# Patient Record
Sex: FEMALE | Race: WHITE
[De-identification: names, ages, dates, MRNs, and addresses within clinical notes are randomized per-mention and may not be internally consistent; named-entity substitution may affect disease eponyms.]

---

## 2017-05-08 ENCOUNTER — HOSPITAL ENCOUNTER (EMERGENCY)
Dept: HOSPITAL 11 - JP.ED | Age: 32
Discharge: HOME | End: 2017-05-08
Payer: MEDICAID

## 2017-05-08 VITALS — DIASTOLIC BLOOD PRESSURE: 83 MMHG | SYSTOLIC BLOOD PRESSURE: 136 MMHG

## 2017-05-08 DIAGNOSIS — N39.0: Primary | ICD-10-CM

## 2017-05-08 DIAGNOSIS — Z79.899: ICD-10-CM

## 2017-05-08 NOTE — EDM.PDOC
ED HPI RENAL/





- General


Chief Complaint: Genitourinary Problem


Stated Complaint: POSSIBLE BLADDER INFECTION


Time Seen by Provider: 17 18:50


Source: Reports: Patient


History Limitations: Reports: No limitations





- History of Present Illness


INITIAL COMMENTS - FREE TEXT/NARRATIVE: 





24 hr hx of decreased ability to urinate. States small amounts of urine 

produced with each void. Minimal pain. Low back pain, greater on left than 

right. No fever or chills. Last UTI was  'a few years ago.' No other concerns.


Location: Reports: suprapubic


Quality: Reports: ache


Severity: mild


Improves with: Reports: urinating


Worsens with: Reports: urinating


Context: Reports: other (recently completed menses. )


Associated Symptoms: Reports: frequency, hesitancy, urgency, voiding small 

amounts





- Related Data


Allergies/ADRs: 


 Allergies











Allergy/AdvReac Type Severity Reaction Status Date / Time


 


aspirin Allergy  Tachycardia Verified 17 18:41











Home Meds: 


 Home Meds





NK [No Known Home Meds]  17 [History]











Past Medical History


OB/GYN History: Reports: Pregnancy


LMP (Approximate): 1 Week





- Past Surgical History


HEENT Surgical History: Reports: Tonsillectomy


Female  Surgical History: Reports:  section, Tubal ligation





Social & Family History





- Tobacco Use


Smoking Status *Q: Never Smoker


Second Hand Smoke Exposure: No





- Recreational Drug Use


Recreational Drug Use: No





ED ROS GENERAL





- Review of Systems


Review Of Systems: See Below


Constitutional: Reports: no symptoms


GI/Abdominal: Reports: No symptoms


: Reports: frequency, urgency, urinary retention


Neurological: Reports: No Symptoms





ED EXAM, RENAL/





- Physical Exam


Exam: See Below


Exam Limited By: No limitations


General Appearance: alert, no apparent distress


Respiratory/Chest: no respiratory distress


Cardiovascular: regular rate, rhythm


GI/Abdominal: soft


 (Female) Exam: Deferred


Back Exam: other (low back pain to palpation).  No: CVA tenderness (R), CVA 

tenderness (L)


Extremities: normal range of motion


Neurological: alert, oriented, normal cognition, normal gait


Psychiatric: normal affect, normal mood


Skin Exam: Warm, Dry





Course





- Vital Signs


Last Recorded V/S: 


 Last Vital Signs











Temp  36.3 C   17 18:41


 


Pulse  90   17 18:41


 


Resp  14   17 18:41


 


BP  136/83   17 18:41


 


Pulse Ox  98   17 18:41














- Orders/Labs/Meds


Orders: 


 Active Orders 24 hr











 Category Date Time Status


 


 CULTURE URINE [RM] Stat Lab  17 19:00 Ordered











Labs: 


 Laboratory Tests











  17 Range/Units





  18:45 


 


Urine Color  Yellow  


 


Urine Appearance  Cloudy  


 


Urine pH  6.0  (4.5-8.0)  


 


Ur Specific Gravity  1.015  (1.008-1.030)  


 


Urine Protein  30 H  (NEGATIVE)  mg/dL


 


Urine Glucose (UA)  Normal  (NEGATIVE)  mg/dL


 


Urine Ketones  Negative  (NEGATIVE)  mg/dL


 


Urine Occult Blood  Large  (NEGATIVE)  


 


Urine Nitrite  Negative  (NEGATIVE)  


 


Urine Bilirubin  Negative  (NEGATIVE)  


 


Urine Urobilinogen  Normal  (NORMAL)  mg/dL


 


Ur Leukocyte Esterase  Moderate  (NEGATIVE)  


 


Urine RBC  10-20 H  (0-5)  


 


Urine WBC  50-75 H  (0-5)  


 


Ur Epithelial Cells  Few  


 


Urine Bacteria  Many  


 


Urine Mucus  Not seen  














Departure





- Departure


Time of Disposition: 19:02


Disposition: Home, Self-Care 01


Condition: good


Clinical Impression: 


 UTI, Urinary tract infectious disease





Instructions:  Urinary Tract Infection, Adult, Easy-to-Read


Referrals: 


PCP,None [Primary Care Provider] - 


Forms:  ED Department Discharge


Additional Instructions: 


1. Nitrofurantoin (antibiotic) 2 times per day for 7 days.


2. Increase your fluid intake, void often.


3. Followup as needed.





- Problem List & Annotations


(1) UTI, Urinary tract infectious disease


SNOMED Code(s): 65184643


   Code(s): N39.0 - URINARY TRACT INFECTION, SITE NOT SPECIFIED   Status: Acute

   Current Visit: Yes   





- Problem List Review


Problem List Initiated/Reviewed/Updated: Yes





- My Orders


Last 24 Hours: 


My Active Orders





17 19:00


CULTURE URINE [RM] Stat 














- Assessment/Plan


Last 24 Hours: 


My Active Orders





17 19:00


CULTURE URINE [RM] Stat

## 2017-07-11 ENCOUNTER — HOSPITAL ENCOUNTER (EMERGENCY)
Dept: HOSPITAL 11 - JP.ED | Age: 32
LOS: 1 days | Discharge: NURSING, REHAB OR LONG-TERM CARE | End: 2017-07-12
Payer: MEDICAID

## 2017-07-11 DIAGNOSIS — G43.909: ICD-10-CM

## 2017-07-11 DIAGNOSIS — Z98.51: ICD-10-CM

## 2017-07-11 DIAGNOSIS — Z98.890: ICD-10-CM
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
## 2017-07-11 DIAGNOSIS — Z88.0: ICD-10-CM

## 2017-07-11 DIAGNOSIS — S37.032A: Primary | ICD-10-CM

## 2017-07-11 PROCEDURE — 81001 URINALYSIS AUTO W/SCOPE: CPT

## 2017-07-11 PROCEDURE — 74177 CT ABD & PELVIS W/CONTRAST: CPT

## 2017-07-11 PROCEDURE — 96374 THER/PROPH/DIAG INJ IV PUSH: CPT

## 2017-07-11 PROCEDURE — 80053 COMPREHEN METABOLIC PANEL: CPT

## 2017-07-11 PROCEDURE — 96375 TX/PRO/DX INJ NEW DRUG ADDON: CPT

## 2017-07-11 PROCEDURE — 85025 COMPLETE CBC W/AUTO DIFF WBC: CPT

## 2017-07-11 PROCEDURE — 36415 COLL VENOUS BLD VENIPUNCTURE: CPT

## 2017-07-11 PROCEDURE — 96376 TX/PRO/DX INJ SAME DRUG ADON: CPT

## 2017-07-11 PROCEDURE — 99285 EMERGENCY DEPT VISIT HI MDM: CPT

## 2017-07-11 PROCEDURE — 71260 CT THORAX DX C+: CPT

## 2017-07-12 VITALS — SYSTOLIC BLOOD PRESSURE: 113 MMHG | DIASTOLIC BLOOD PRESSURE: 61 MMHG

## 2017-07-12 NOTE — EDM.PDOC
ED HPI GENERAL MEDICAL PROBLEM





- General


Chief Complaint: Trauma


Stated Complaint: FELL HORSE - L SIDE PAIN


Time Seen by Provider: 17 23:14


Source of Information: Reports: Patient


History Limitations: Reports: No Limitations





- History of Present Illness


INITIAL COMMENTS - FREE TEXT/NARRATIVE: 





This lady was thrown off a horse a short while prior to arrival. She complains 

to pain to the left side of her chest and the left flank area. She describes 

the pain as severe. She got a little bump to the left side of her head in the 

area of the orbital rim and left frontoparietal area. She does not have any 

kind of neck pain. She noticed a little bit of blood in her urine


Treatments PTA: Reports: Cold Therapy


  ** left flank


Pain Score (Numeric/FACES): 9





- Related Data


 Allergies











Allergy/AdvReac Type Severity Reaction Status Date / Time


 


aspirin Allergy  Tachycardia Verified 17 22:47











Home Meds: 


 Home Meds





NK [No Known Home Meds]  17 [History]











Past Medical History


Genitourinary History: Reports: UTI, Recurrent


OB/GYN History: Reports: Pregnancy


Neurological History: Reports: Concussion, Migraines


Hematologic History: Reports: None


Immunologic History: Reports: None





- Past Surgical History


HEENT Surgical History: Reports: Tonsillectomy


Female  Surgical History: Reports:  Section, Tubal Ligation





Social & Family History





- Tobacco Use


Smoking Status *Q: Never Smoker


Second Hand Smoke Exposure: No





- Caffeine Use


Caffeine Use: Reports: Coffee, Soda





- Recreational Drug Use


Recreational Drug Use: No





Review of Systems





- Review of Systems


Review Of Systems: See Below


Constitutional: Reports: No Symptoms


Eyes: Reports: No Symptoms


Ears: Reports: No Symptoms


Nose: Reports: No Symptoms


Mouth/Throat: Reports: No Symptoms


Respiratory: Reports: No Symptoms, Other (Left-sided chest pain)


Cardiovascular: Reports: No Symptoms


GI/Abdominal: Reports: No Symptoms, Other


Genitourinary: Reports: Hematuria, Other (Left flank pain)


Musculoskeletal: Denies: Neck Pain


Skin: Reports: No Symptoms





ED EXAM, GENERAL





- Physical Exam


Exam: See Below


Exam Limited By: No Limitations


General Appearance: Alert, WD/WN, Moderate Distress


Eye Exam: Bilateral Eye: Normal Inspection


Ears: Normal External Exam


Nose: Normal Inspection


Throat/Mouth: Normal Inspection


Head: Other (There is some very faint bruising to the lateral margin of the 

left orbital rim. That area slightly tender in the tenderness extends to the 

left frontoparietal area this is just a very mild injury.)


Neck: Normal Inspection, Supple, Non-Tender, Full Range of Motion (Painless 

range of motion of the neck)


Respiratory/Chest: Lungs Clear (She appears to have some splinting on the left 

side)


Cardiovascular: Normal Peripheral Pulses, Regular Rate, Rhythm, No Murmur


GI/Abdominal: Soft, Non-Tender


Back Exam: CVA Tenderness (L)


Extremities: Normal Inspection


Neurological: Alert, Oriented


Psychiatric: Normal Affect


Skin Exam: Warm, Dry





Course





- Vital Signs


Last Recorded V/S: 





 Last Vital Signs











Temp  37.0 C   17 00:35


 


Pulse  81   17 00:35


 


Resp  15   17 00:35


 


BP  113/61   17 00:35


 


Pulse Ox  96   17 00:35














- Orders/Labs/Meds


Orders: 





 Active Orders 24 hr











 Category Date Time Status


 


 Chest Abdomen Pelvis w Cont [CT] Stat Exams  17 23:11 Taken


 


 Sodium Chloride 0.9% [Saline Flush] Med  17 23:11 Active





 10 ml FLUSH ASDIRECTED PRN   


 


 Saline Lock Insert [OM.PC] Urgent Oth  17 23:11 Ordered








 Medication Orders





Sodium Chloride (Saline Flush)  10 ml FLUSH ASDIRECTED PRN


   PRN Reason: Keep Vein Open


   Last Admin: 17 23:31  Dose: 10 ml








Labs: 





 Laboratory Tests











  17 Range/Units





  23:20 23:25 23:25 


 


WBC   14.4 H   (4.5-11.0)  K/uL


 


RBC   4.43   (3.30-5.50)  M/uL


 


Hgb   12.3   (12.0-15.0)  g/dL


 


Hct   36.2   (36.0-48.0)  %


 


MCV   82   (80-98)  fL


 


MCH   28   (27-31)  pg


 


MCHC   34   (32-36)  %


 


Plt Count   235   (150-400)  K/uL


 


Neut % (Auto)   77 H   (36-66)  %


 


Lymph % (Auto)   13 L   (24-44)  %


 


Mono % (Auto)   9 H   (2-6)  %


 


Eos % (Auto)   0 L   (2-4)  %


 


Baso % (Auto)   0   (0-1)  %


 


Sodium    136 L  (140-148)  mmol/L


 


Potassium    3.8  (3.6-5.2)  mmol/L


 


Chloride    102  (100-108)  mmol/L


 


Carbon Dioxide    26  (21-32)  mmol/L


 


Anion Gap    11.8  (5.0-14.0)  mmol/L


 


BUN    12  (7-18)  mg/dL


 


Creatinine    1.1 H  (0.6-1.0)  mg/dL


 


Est Cr Clr Drug Dosing    72.06  mL/min


 


Estimated GFR (MDRD)    58 L  (>60)  


 


Glucose    122 H  ()  mg/dL


 


Calcium    8.8  (8.5-10.1)  mg/dL


 


Total Bilirubin    0.2  (0.2-1.0)  mg/dL


 


AST    57 H D  (15-37)  U/L


 


ALT    40  D  (12-78)  U/L


 


Alkaline Phosphatase    70  ()  U/L


 


Total Protein    7.2  (6.4-8.2)  g/dL


 


Albumin    3.7  (3.4-5.0)  g/dL


 


Globulin    3.5  (2.3-3.5)  g/dL


 


Albumin/Globulin Ratio    1.1 L  (1.2-2.2)  


 


Urine Color  Brown    


 


Urine Appearance  Cloudy    


 


Urine pH  5.0    (4.5-8.0)  


 


Ur Specific Gravity  1.020    (1.008-1.030)  


 


Urine Protein  100 H    (NEGATIVE)  mg/dL


 


Urine Glucose (UA)  50 H    (NEGATIVE)  mg/dL


 


Urine Ketones  Negative    (NEGATIVE)  mg/dL


 


Urine Occult Blood  Large    (NEGATIVE)  


 


Urine Nitrite  Negative    (NEGATIVE)  


 


Urine Bilirubin  Negative    (NEGATIVE)  


 


Urine Urobilinogen  Normal    (NORMAL)  mg/dL


 


Ur Leukocyte Esterase  Negative    (NEGATIVE)  


 


Urine RBC  Semi-packed H    (0-5)  


 


Urine WBC  5-10 H    (0-5)  


 


Ur Epithelial Cells  Few    


 


Amorphous Sediment  Not seen    


 


Urine Bacteria  Moderate    


 


Urine Mucus  Not seen    











Meds: 





Medications











Generic Name Dose Route Start Last Admin





  Trade Name Freq  PRN Reason Stop Dose Admin


 


Sodium Chloride  10 ml  17 23:11  17 23:31





  Saline Flush  FLUSH   10 ml





  ASDIRECTED PRN   Administration





  Keep Vein Open   














Discontinued Medications














Generic Name Dose Route Start Last Admin





  Trade Name Freq  PRN Reason Stop Dose Admin


 


Hydromorphone HCl  1 mg  17 23:13  17 23:35





  Dilaudid  IVPUSH  17 23:14  1 mg





  ONETIME ONE   Administration


 


Hydromorphone HCl  1 mg  17 00:36  17 00:45





  Dilaudid  IVPUSH  17 00:37  1 mg





  ONETIME ONE   Administration


 


Sodium Chloride  80 mls @ 3 mls/sec  17 23:29  17 23:43





  Normal Saline  IV  17 23:30  3 mls/sec





  ASDIRECTED STA   Administration


 


Iopamidol  100 ml  17 23:29  17 23:43





  Isovue-300 (61%)  IV  17 23:30  100 ml





  .AS DIRECTED STA   Administration


 


Ondansetron HCl  4 mg  17 23:12  17 23:32





  Zofran  IVPUSH  17 23:13  4 mg





  ONETIME ONE   Administration


 


Ondansetron HCl  4 mg  17 00:38  17 00:45





  Zofran  IVPUSH  17 00:39  4 mg





  ONETIME ONE   Administration














- Radiology Interpretation


Free Text/Narrative:: 





CT of the chest abdomen and pelvis showed an acute injury to the left kidney 

with perinephric fluid a linear laceration involving the anterior medial left 

renal cortex extending to the medullary region renal vein left renal artery are 

patent this is considered a grade 2 injury no high density focus to indicate 

acute hemorrhage apparently the perinephric fluid is extravasated urine. Rest 

of the chest abdomen and pelvis was all normal.





- Re-Assessments/Exams


Free Text/Narrative Re-Assessment/Exam: 





17 00:54


An IV was established she was given Dilaudid 1 mg IV. When she returned from CT 

she was continued to have pain since she was given an additional 1 mg Dilaudid 

and 4 of Zofran. I spoke with  at [facility] in [place] and he has 

accepted the patient and she will be seen in the emergency department. She'll 

be transported by ambulance. She is hemodynamically stable.





Departure





- Departure


Time of Disposition: 00:56


Disposition: DC/Tfer to Acute Hospital 02


Condition: Serious


Clinical Impression: 


 Major laceration of left kidney








- Discharge Information


Forms:  ED Department Discharge





- My Orders


Last 24 Hours: 





My Active Orders





17 23:11


Chest Abdomen Pelvis w Cont [CT] Stat 


Sodium Chloride 0.9% [Saline Flush]   10 ml FLUSH ASDIRECTED PRN 


Saline Lock Insert [OM.PC] Urgent 














- Assessment/Plan


Last 24 Hours: 





My Active Orders





17 23:11


Chest Abdomen Pelvis w Cont [CT] Stat 


Sodium Chloride 0.9% [Saline Flush]   10 ml FLUSH ASDIRECTED PRN 


Saline Lock Insert [OM.PC] Urgent

## 2018-04-12 ENCOUNTER — HOSPITAL ENCOUNTER (EMERGENCY)
Dept: HOSPITAL 11 - JP.ED | Age: 33
Discharge: HOME | End: 2018-04-12
Payer: MEDICAID

## 2018-04-12 VITALS — DIASTOLIC BLOOD PRESSURE: 88 MMHG | SYSTOLIC BLOOD PRESSURE: 136 MMHG

## 2018-04-12 DIAGNOSIS — R74.0: Primary | ICD-10-CM

## 2018-04-12 DIAGNOSIS — M25.50: ICD-10-CM

## 2018-04-12 DIAGNOSIS — Z88.6: ICD-10-CM

## 2018-04-12 PROCEDURE — 99284 EMERGENCY DEPT VISIT MOD MDM: CPT

## 2018-04-12 PROCEDURE — 85027 COMPLETE CBC AUTOMATED: CPT

## 2018-04-12 PROCEDURE — 86803 HEPATITIS C AB TEST: CPT

## 2018-04-12 PROCEDURE — 96372 THER/PROPH/DIAG INJ SC/IM: CPT

## 2018-04-12 PROCEDURE — 36415 COLL VENOUS BLD VENIPUNCTURE: CPT

## 2018-04-12 PROCEDURE — 84450 TRANSFERASE (AST) (SGOT): CPT

## 2023-04-02 NOTE — EDM.PDOC
ED HPI GENERAL MEDICAL PROBLEM





- General


Chief Complaint: General


Stated Complaint: ILLNESS


Time Seen by Provider: 18 22:10


Source of Information: Reports: Patient, Old Records, RN


History Limitations: Reports: No Limitations





- History of Present Illness


INITIAL COMMENTS - FREE TEXT/NARRATIVE: 





34 yo female was seen in the clinic 2 days ago for fever, joint pains, a rash, 

and a HA. The fever seems to have recently resolved. No tests were done in the 

clinic. Marry is concerned that something was missed in the clinic. No joint 

swelling, but pain moves around to different joints. Her tongue feels funny. 

Tingling of her hands and feet at times. Still has a "migraine". No nausea, 

diarrhea or dysuria. No sore throat. Has dogs that go outside and then come 

inside. 


Onset: Gradual


Onset Date: 18


Duration: Day(s):, Waxing/Waning


Location: Reports: Generalized


Quality: Reports: Ache (joints, varying)


Severity: Mild


Improves with: Reports: None


Worsens with: Reports: None


Context: Reports: Other (recent febrile illness)


Associated Symptoms: Reports: Fever/Chills, Headaches, Rash.  Denies: Cough, 

Nausea/Vomiting, Shortness of Breath


Treatments PTA: Reports: Other (see below) (none)


  ** joints


Pain Score (Numeric/FACES): 7





- Related Data


 Allergies











Allergy/AdvReac Type Severity Reaction Status Date / Time


 


aspirin Allergy  Tachycardia Verified 18 21:43











Home Meds: 


 Home Meds





Acetaminophen [Tylenol Extra Strength] 1,000 mg PO Q6H PRN 18 [History]


Cyclobenzaprine HCl 1 tab PO TID PRN 18 [History]











Past Medical History


Genitourinary History: Reports: UTI, Recurrent, Other (See Below)


Other Genitourinary History: left kidney laceration in 2017


OB/GYN History: Reports: Pregnancy


Neurological History: Reports: Concussion, Migraines


Hematologic History: Reports: None


Immunologic History: Reports: None





- Past Surgical History


HEENT Surgical History: Reports: Adenoidectomy, Tonsillectomy


Female  Surgical History: Reports:  Section, Tubal Ligation





Social & Family History





- Tobacco Use


Smoking Status *Q: Never Smoker


Second Hand Smoke Exposure: No





- Caffeine Use


Caffeine Use: Reports: Soda





- Recreational Drug Use


Recreational Drug Use: No





ED ROS GENERAL





- Review of Systems


Review Of Systems: See Below


Constitutional: Reports: Fever


HEENT: Reports: Other (tongue feels funny)


Respiratory: Reports: No Symptoms


Cardiovascular: Reports: No Symptoms


Endocrine: Reports: No Symptoms


GI/Abdominal: Reports: No Symptoms


: Reports: No Symptoms


Musculoskeletal: Reports: Joint Pain (migratory, no redness or swelling.)


Skin: Reports: Rash (faint red)


Neurological: Reports: Tingling (hands and feet intermittently)


Psychiatric: Reports: No Symptoms





ED EXAM, GENERAL





- Physical Exam


Exam: See Below


Exam Limited By: No Limitations


General Appearance: Alert, WD/WN, No Apparent Distress


Eye Exam: Bilateral Eye: Normal Inspection


Ears: Normal External Exam, Normal Canal, Hearing Grossly Normal, Normal TMs


Ear Exam: Bilateral Ear: Auricle Normal, Canal Normal, TM normal


Nose: Normal Inspection, Normal Mucosa, No Blood


Throat/Mouth: Normal Inspection, Normal Lips, Normal Oropharynx, Normal Voice, 

No Airway Compromise, Other (Tongue looks a little more red than normal, no 

coating on surface of tongue. )


Head: Atraumatic, Normocephalic


Neck: Normal Inspection, Supple, Non-Tender


Respiratory/Chest: No Respiratory Distress, Lungs Clear, Normal Breath Sounds, 

No Accessory Muscle Use


Cardiovascular: Regular Rate, Rhythm, No Edema


GI/Abdominal: Normal Bowel Sounds, Soft, Non-Tender, No Distention


Back Exam: Normal Inspection.  No: CVA Tenderness (R), CVA Tenderness (L)


Extremities: Normal Inspection, Normal Range of Motion, Non-Tender, No Pedal 

Edema


Neurological: Alert, Oriented, CN II-XII Intact, Normal Cognition, No Motor/

Sensory Deficits


Psychiatric: Normal Affect, Normal Mood


Skin Exam: Warm, Dry, Intact, Normal Color, Rash (fine red rash on extrems.)


Lymphatic: No Adenopathy





Course





- Vital Signs


Last Recorded V/S: 


 Last Vital Signs











Temp  36.2 C   18 21:44


 


Pulse  98   18 21:44


 


Resp  18   18 21:44


 


BP  136/88   18 21:44


 


Pulse Ox  97   18 21:44








 





Orthostatic Blood Pressure [     129/92


Standing]                        


Orthostatic Blood Pressure [     129/85


Sitting]                         


Orthostatic Blood Pressure [     127/83


Supine]                          











- Orders/Labs/Meds


Orders: 


 Active Orders 24 hr











 Category Date Time Status


 


 Orthostatic Vital Signs [RC] ASDIRECTED Care  18 21:53 Active


 


 HEPATITIS C ANTIBODY [REF] Stat Lab  18 23:12 Ordered











Labs: 


 Laboratory Tests











  18 Range/Units





  22:22 22:22 


 


WBC  7.0   (4.5-11.0)  K/uL


 


RBC  4.45   (3.30-5.50)  M/uL


 


Hgb  12.3   (12.0-15.0)  g/dL


 


Hct  37.7   (36.0-48.0)  %


 


MCV  85   (80-98)  fL


 


MCH  28   (27-31)  pg


 


MCHC  33   (32-36)  %


 


Plt Count  240   (150-400)  K/uL


 


AST   98 H  (15-37)  U/L











Meds: 


Medications














Discontinued Medications














Generic Name Dose Route Start Last Admin





  Trade Name Freq  PRN Reason Stop Dose Admin


 


Ketorolac Tromethamine  60 mg  18 22:20  18 22:30





  Toradol  IM  18 22:21  60 mg





  ONETIME ONE   Administration





     





     





     





     














Departure





- Departure


Time of Disposition: 23:13


Disposition: Home, Self-Care 01


Condition: Fair


Clinical Impression: 


 Elevated AST (SGOT)





Arthralgia


Qualifiers:


 Joint pain location: unspecified Qualified Code(s): M25.50 - Pain in 

unspecified joint








- Discharge Information


Referrals: 


PCP,None [Primary Care Provider] - 


Forms:  ED Department Discharge





- My Orders


Last 24 Hours: 


My Active Orders





18 21:53


Orthostatic Vital Signs [RC] ASDIRECTED 





18 23:12


HEPATITIS C ANTIBODY [REF] Stat 














- Assessment/Plan


Last 24 Hours: 


My Active Orders





18 21:53


Orthostatic Vital Signs [RC] ASDIRECTED 





18 23:12


HEPATITIS C ANTIBODY [REF] Stat
Home